# Patient Record
Sex: MALE | Race: WHITE | NOT HISPANIC OR LATINO | Employment: FULL TIME | URBAN - METROPOLITAN AREA
[De-identification: names, ages, dates, MRNs, and addresses within clinical notes are randomized per-mention and may not be internally consistent; named-entity substitution may affect disease eponyms.]

---

## 2018-04-19 ENCOUNTER — TRANSCRIBE ORDERS (OUTPATIENT)
Dept: ADMINISTRATIVE | Facility: HOSPITAL | Age: 68
End: 2018-04-19

## 2018-04-19 ENCOUNTER — HOSPITAL ENCOUNTER (OUTPATIENT)
Dept: GENERAL RADIOLOGY | Facility: HOSPITAL | Age: 68
Discharge: HOME | End: 2018-04-19
Admitting: NURSE PRACTITIONER

## 2018-04-19 DIAGNOSIS — M54.5 LOW BACK PAIN, UNSPECIFIED BACK PAIN LATERALITY, UNSPECIFIED CHRONICITY, WITH SCIATICA PRESENCE UNSPECIFIED: Primary | ICD-10-CM

## 2018-04-19 PROCEDURE — 72100 X-RAY EXAM L-S SPINE 2/3 VWS: CPT

## 2018-08-02 ENCOUNTER — HOSPITAL ENCOUNTER (OUTPATIENT)
Dept: GENERAL RADIOLOGY | Facility: HOSPITAL | Age: 68
Discharge: HOME | End: 2018-08-02
Admitting: FAMILY MEDICINE

## 2018-08-02 ENCOUNTER — TRANSCRIBE ORDERS (OUTPATIENT)
Dept: ADMINISTRATIVE | Facility: HOSPITAL | Age: 68
End: 2018-08-02

## 2018-08-02 DIAGNOSIS — R05.9 COUGH: Primary | ICD-10-CM

## 2018-08-02 PROCEDURE — 71046 X-RAY EXAM CHEST 2 VIEWS: CPT

## 2021-06-14 ENCOUNTER — HOSPITAL ENCOUNTER (OUTPATIENT)
Dept: RADIATION ONCOLOGY | Facility: HOSPITAL | Age: 71
Setting detail: RADIATION/ONCOLOGY SERIES
Discharge: HOME | End: 2021-06-14

## 2021-06-14 ENCOUNTER — OFFICE VISIT (OUTPATIENT)
Dept: RADIATION ONCOLOGY | Facility: HOSPITAL | Age: 71
End: 2021-06-14

## 2021-06-14 VITALS
DIASTOLIC BLOOD PRESSURE: 78 MMHG | RESPIRATION RATE: 18 BRPM | OXYGEN SATURATION: 97 % | WEIGHT: 185.6 LBS | TEMPERATURE: 98.2 F | SYSTOLIC BLOOD PRESSURE: 152 MMHG | HEART RATE: 68 BPM

## 2021-06-14 DIAGNOSIS — C44.311 BASAL CELL CARCINOMA (BCC) OF NASAL TIP: Primary | ICD-10-CM

## 2021-06-14 PROCEDURE — G0463 HOSPITAL OUTPT CLINIC VISIT: HCPCS

## 2021-06-14 RX ORDER — METRONIDAZOLE 7.5 MG/G
GEL TOPICAL
COMMUNITY
Start: 2021-05-17

## 2021-06-14 NOTE — PROGRESS NOTES
CONSULTATION NOTE      :                                                          1950  DATE OF CONSULTATION:                       2021   REQUESTING PHYSICIAN:                   Ethel Lowery MD  REASON FOR CONSULTATION:           Basal Cell Carcinoma of the Nasal Tip  Cancer Staging  Stage I (cT1, cN0, cM0)    Thank you for requesting my services in evaluation of this pleasant individual.  I am seeing them in outpatient consultation regarding a diagnosis of basal cell carcinoma of the nasal tip.     BRIEF HISTORY:  The patient is a very pleasant 71 y.o. male  with no specific past medical history other than chronic sun exposure as well as minimal past smoking history limited to cigars only, who presented to dermatology 3 months ago with complaints of a small papular lesion on the nasal tip.  A biopsy was performed on  which was consistent with a basal cell carcinoma.  Due to the location and size, he was referred to Dr. Ethel Lowery of [facility] who recommended a Mohs resection, but for fear that it would alter his appearance, Mr. Gaston has requested a second opinion for consideration of nonsurgical options.  Presents to my clinic today for consideration of definitive radiation treatments.  From a symptomatic standpoint, he denies pain, bleeding, or any facial/head and neck symptoms.    Allergy: No Known Allergies    Social History:   Social History     Socioeconomic History   • Marital status:      Spouse name: Not on file   • Number of children: Not on file   • Years of education: Not on file   • Highest education level: Not on file   Tobacco Use   • Smoking status: Current Some Day Smoker     Types: Cigars   • Smokeless tobacco: Never Used   • Tobacco comment: on the weekends   Substance and Sexual Activity   • Alcohol use: Yes     Comment: socially   • Drug use: Never   • Sexual activity: Defer       Past Medical History:   Past Medical History:   Diagnosis Date    • Skin cancer     basal cell       Family History: family history includes Breast cancer in his mother; No Known Problems in his father.     Surgical History:   Past Surgical History:   Procedure Laterality Date   • COLONOSCOPY      2013        Review of Systems:   Review of Systems   All other systems reviewed and are negative.          Objective   VITAL SIGNS:   Vitals:    06/14/21 0806   BP: 152/78   Pulse: 68   Resp: 18   Temp: 98.2 °F (36.8 °C)   TempSrc: Temporal   SpO2: 97%   Weight: 84.2 kg (185 lb 9.6 oz)   PainSc: 0-No pain        Karnofsky score: 90      Physical Exam:   Physical Exam  Vitals and nursing note reviewed.   Constitutional:       General: He is not in acute distress.     Appearance: He is well-developed.   HENT:      Head: Normocephalic and atraumatic.      Nose:      Comments: There is a small 3 x 5 mm papular lesion on the nasal tip just to the right of midline, see image below.  Eyes:      Conjunctiva/sclera: Conjunctivae normal.      Pupils: Pupils are equal, round, and reactive to light.   Cardiovascular:      Rate and Rhythm: Normal rate and regular rhythm.      Heart sounds: No murmur heard.   No friction rub.   Pulmonary:      Effort: Pulmonary effort is normal.      Breath sounds: Normal breath sounds. No wheezing.   Abdominal:      General: Bowel sounds are normal. There is no distension.      Palpations: Abdomen is soft. There is no mass.      Tenderness: There is no abdominal tenderness.   Musculoskeletal:         General: Normal range of motion.      Cervical back: Normal range of motion and neck supple.   Lymphadenopathy:      Cervical: No cervical adenopathy.   Skin:     General: Skin is warm and dry.   Neurological:      Mental Status: He is alert and oriented to person, place, and time.   Psychiatric:         Behavior: Behavior normal.         Thought Content: Thought content normal.         Judgment: Judgment normal.       PATHOLOGY  Nasal Tip Biopsy 3/2/2021 ([facility]):  Basal cell carcinoma, the lesion involves the peripheral margin and involves the deep margin.  The specimen is 0.6 x 0.5 x 0.1 cm       The following portions of the patient's history were reviewed and updated as appropriate: allergies, current medications, past family history, past medical history, past social history, past surgical history and problem list.    Assessment:   Assessment  Mr. Gaston is a 71-year-old gentleman who presents now with a clinical T1N0 basal cell carcinoma of the nasal tip.  He would like to pursue nonsurgical therapy, and I do believe that this tumor is amenable to management using superficial electrons.  My recommendation would be to treat him to a dose of 40 Gy in 10 fractions that we would deliver 3 treatments per week.  After full explanation of the risks and benefits, he was agreeable and signed informed consent.  He is about to be at [facility] for a several week trip, therefore I arranged for him to return to my clinic on July 6, 2021.  On that date, we will complete a radiation set up and simulation session as well as creation of complex boluses in order to allow us to deliver uniform dose to the surface of his nose.  I will likely aim to have his treatments ready to begin 1 week later pending insurance authorization.    RECOMMENDATIONS:    Treat the nasal tip using superficial electrons to 40 Gy in 10 fractions    Follow Up:   Return in about 22 days (around 7/6/2021) for Radiation Simulation.  Diagnoses and all orders for this visit:    1. Basal cell carcinoma (BCC) of nasal tip (Primary)    Thank you for allowing me to participate in the care of this individual.    Sincerely,       Hank Schaffer MD

## 2021-07-01 ENCOUNTER — HOSPITAL ENCOUNTER (OUTPATIENT)
Dept: RADIATION ONCOLOGY | Facility: HOSPITAL | Age: 71
Setting detail: RADIATION/ONCOLOGY SERIES
Discharge: HOME | End: 2021-07-01

## 2021-07-06 ENCOUNTER — HOSPITAL ENCOUNTER (OUTPATIENT)
Dept: RADIATION ONCOLOGY | Facility: HOSPITAL | Age: 71
Discharge: HOME | End: 2021-07-06

## 2021-07-06 PROCEDURE — 77334 RADIATION TREATMENT AID(S): CPT | Performed by: RADIOLOGY

## 2021-07-06 PROCEDURE — 77290 THER RAD SIMULAJ FIELD CPLX: CPT | Performed by: RADIOLOGY

## 2021-07-12 PROCEDURE — 77321 SPECIAL TELETX PORT PLAN: CPT | Performed by: RADIOLOGY

## 2021-07-19 ENCOUNTER — HOSPITAL ENCOUNTER (OUTPATIENT)
Dept: RADIATION ONCOLOGY | Facility: HOSPITAL | Age: 71
Discharge: HOME | End: 2021-07-19

## 2021-07-19 PROCEDURE — 77412 RADIATION TX DELIVERY LVL 3: CPT | Performed by: RADIOLOGY

## 2021-07-21 ENCOUNTER — HOSPITAL ENCOUNTER (OUTPATIENT)
Dept: RADIATION ONCOLOGY | Facility: HOSPITAL | Age: 71
Discharge: HOME | End: 2021-07-21

## 2021-07-21 PROCEDURE — 77412 RADIATION TX DELIVERY LVL 3: CPT | Performed by: RADIOLOGY

## 2021-07-22 PROCEDURE — 77336 RADIATION PHYSICS CONSULT: CPT | Performed by: RADIOLOGY

## 2021-07-23 ENCOUNTER — HOSPITAL ENCOUNTER (OUTPATIENT)
Dept: RADIATION ONCOLOGY | Facility: HOSPITAL | Age: 71
Discharge: HOME | End: 2021-07-23

## 2021-07-23 VITALS — WEIGHT: 187.6 LBS

## 2021-07-23 PROCEDURE — 77412 RADIATION TX DELIVERY LVL 3: CPT | Performed by: RADIOLOGY

## 2021-07-23 PROCEDURE — 77331 SPECIAL RADIATION DOSIMETRY: CPT | Performed by: RADIOLOGY

## 2021-07-26 ENCOUNTER — HOSPITAL ENCOUNTER (OUTPATIENT)
Dept: RADIATION ONCOLOGY | Facility: HOSPITAL | Age: 71
Discharge: HOME | End: 2021-07-26

## 2021-07-26 PROCEDURE — 77412 RADIATION TX DELIVERY LVL 3: CPT | Performed by: RADIOLOGY

## 2021-07-28 ENCOUNTER — HOSPITAL ENCOUNTER (OUTPATIENT)
Dept: RADIATION ONCOLOGY | Facility: HOSPITAL | Age: 71
Discharge: HOME | End: 2021-07-28

## 2021-07-28 PROCEDURE — 77412 RADIATION TX DELIVERY LVL 3: CPT | Performed by: RADIOLOGY

## 2021-07-30 ENCOUNTER — HOSPITAL ENCOUNTER (OUTPATIENT)
Dept: RADIATION ONCOLOGY | Facility: HOSPITAL | Age: 71
Discharge: HOME | End: 2021-07-30

## 2021-07-30 VITALS — WEIGHT: 182 LBS

## 2021-07-30 PROCEDURE — 77412 RADIATION TX DELIVERY LVL 3: CPT | Performed by: RADIOLOGY

## 2021-07-30 PROCEDURE — 77336 RADIATION PHYSICS CONSULT: CPT | Performed by: RADIOLOGY

## 2021-08-01 ENCOUNTER — HOSPITAL ENCOUNTER (OUTPATIENT)
Dept: RADIATION ONCOLOGY | Facility: HOSPITAL | Age: 71
Setting detail: RADIATION/ONCOLOGY SERIES
Discharge: HOME | End: 2021-08-01

## 2021-08-02 ENCOUNTER — HOSPITAL ENCOUNTER (OUTPATIENT)
Dept: RADIATION ONCOLOGY | Facility: HOSPITAL | Age: 71
Discharge: HOME | End: 2021-08-02

## 2021-08-02 PROCEDURE — 77412 RADIATION TX DELIVERY LVL 3: CPT | Performed by: RADIOLOGY

## 2021-08-04 ENCOUNTER — HOSPITAL ENCOUNTER (OUTPATIENT)
Dept: RADIATION ONCOLOGY | Facility: HOSPITAL | Age: 71
Discharge: HOME | End: 2021-08-04

## 2021-08-04 VITALS — WEIGHT: 184.6 LBS

## 2021-08-04 PROCEDURE — 77412 RADIATION TX DELIVERY LVL 3: CPT | Performed by: RADIOLOGY

## 2021-08-05 ENCOUNTER — HOSPITAL ENCOUNTER (OUTPATIENT)
Dept: RADIATION ONCOLOGY | Facility: HOSPITAL | Age: 71
Discharge: HOME | End: 2021-08-05

## 2021-08-05 PROCEDURE — 77412 RADIATION TX DELIVERY LVL 3: CPT | Performed by: RADIOLOGY

## 2021-08-06 ENCOUNTER — HOSPITAL ENCOUNTER (OUTPATIENT)
Dept: RADIATION ONCOLOGY | Facility: HOSPITAL | Age: 71
Discharge: HOME | End: 2021-08-06

## 2021-08-06 PROCEDURE — 77412 RADIATION TX DELIVERY LVL 3: CPT | Performed by: RADIOLOGY

## 2021-08-12 NOTE — RADIATION COMPLETION NOTES
DATE OF COMPLETION: 8/6/2021  DIAGNOSIS: Nasal Tip Basal Cell Carcinoma  Cancer Staging  Stage I (cT1, cN0, cM0)    REFERRING: Ethel Lowery MD        Marleni Thomas completed radiation therapy today.      BACKGROUND: Marleni Gaston is a 71-year-old gentleman who is been diagnosed with a basal cell carcinoma of the nasal tip.  He was presented the option of Mohs surgical resection, but he opted for nonsurgical therapy, and instead underwent a course of superficial electron radiation therapy as detailed below:    Treatment Summary     Dates of Therapy: 7/19/2021-8/6/2021  Treatment Site: Nasal tip  Dose: 40 Gy in 10 fractions of 4 Gy each  Technique: En face electrons using 6 MV V energy with custom compensators were utilized to treat the superficial portions of the nasal tip.    Treatment Course and Tolerance: He tolerated radiation treatments well.  He did develop the anticipated grade 1 skin erythema on the anterior portion of his nose, but he did demonstrate resolution of the visible abnormality during the course of treatment.  He did not develop any other acute radiation related toxicities.    The initial follow up visit will be in 1 month.    Marleni Gaston knows to call if any problems or concerns develop in the meantime.     Electronically signed by: Hank Schaffer MD                    Cc: Ethel Enciso APRN

## 2021-09-13 ENCOUNTER — OFFICE VISIT (OUTPATIENT)
Dept: RADIATION ONCOLOGY | Facility: HOSPITAL | Age: 71
End: 2021-09-13

## 2021-09-13 ENCOUNTER — HOSPITAL ENCOUNTER (OUTPATIENT)
Dept: RADIATION ONCOLOGY | Facility: HOSPITAL | Age: 71
Setting detail: RADIATION/ONCOLOGY SERIES
Discharge: HOME | End: 2021-09-13

## 2021-09-13 VITALS
WEIGHT: 179.8 LBS | HEART RATE: 58 BPM | TEMPERATURE: 97.8 F | DIASTOLIC BLOOD PRESSURE: 74 MMHG | SYSTOLIC BLOOD PRESSURE: 147 MMHG | BODY MASS INDEX: 25.74 KG/M2 | OXYGEN SATURATION: 98 % | HEIGHT: 70 IN | RESPIRATION RATE: 16 BRPM

## 2021-09-13 DIAGNOSIS — C44.311 BASAL CELL CARCINOMA (BCC) OF NASAL TIP: Primary | ICD-10-CM

## 2021-09-13 PROCEDURE — G0463 HOSPITAL OUTPT CLINIC VISIT: HCPCS
